# Patient Record
(demographics unavailable — no encounter records)

---

## 2024-10-08 NOTE — HISTORY OF PRESENT ILLNESS
[FreeTextEntry1] : 58 yr old here for follow up for vaginal irritation, states she had sex thursday and saturday and has noticed burning, itching and an odor. Has a hx of HSV, states this doesnt feel like one of her outbreaks. Endorses chronic vaginal dryness, has a hx of BV in the past. Denies any urinary sx. vaginal bleeding

## 2024-10-08 NOTE — PLAN
[FreeTextEntry1] : affirm done GC/CT declining STI bloods flagyl sent (pt prefers oral) discussed vaginal atrophy, declining vaginal estrogen reviewed to avoid douching, reviewed cindy Sanchez MD

## 2024-10-08 NOTE — PHYSICAL EXAM
[Labia Majora] : normal [Labia Minora] : normal [Atrophy] : atrophy [Normal] : normal [Uterine Adnexae] : normal [FreeTextEntry4] : scant discharge, +fishy odor

## 2024-11-20 NOTE — HISTORY OF PRESENT ILLNESS
[FreeTextEntry1] : cough [de-identified] : uri symps 3-4 days cough, congestion, sore throat, productive cough--not improving no temps--rapid covid neg has an outbreak of genital herpes

## 2024-12-23 NOTE — ADDENDUM
[FreeTextEntry1] : This note was written by Lj Plasencia on 12/23/2024 acting as scribe for Dr. Erasto Sethi M.D.  I, Erasto Sethi MD, have read and attest that all the information, medical decision making and discharge instructions within are true and accurate.

## 2024-12-23 NOTE — ADDENDUM
SAFETY PLAN    A suicide Safety Plan is a document that supports someone when they are having thoughts of suicide. Warning Signs that indicate a suicidal crisis may be developing: What (situations, thoughts, feelings, body sensations, behaviors, etc.) do you experience that lets you know you are beginning to think about suicide? 1. Not taking medications  2. restlessness  3. Mood swings, mad irritated    Internal Coping Strategies:  What things can I do (relaxation techniques, hobbies, physical activities, etc.) to take my mind off my problems without contacting another person? 1. walking  2. Call family  3. read    People and social settings that provide distraction: Who can I call or where can I go to distract me? 1. Name: Oz Sonotek  Phone: unknown  2. Name: Chrissy Santana  Phone:   3. Place:  none          4. Place: [employer] whom I can ask for help: Who can I call when I need help - for example, friends, family, clergy, someone else? 1. Name: Shellia Plate               Phone: unknown at this time  2. Name: Caron  Phone: unknown  3. Name: Unknown  Phone: unknown    Professionals or [employer] agencies I can contact during a crisis: Who can I call for help - for example, my doctor, my psychiatrist, my psychologist, a mental health provider, a suicide hotline? 1. Clinician Name: Kerri Theodore  Phone:       Clinician Pager or Emergency Contact #:[phone]    2. Clinician Name: none  Phone: none      Clinician Pager or Emergency Contact #: none    3. Suicide Prevention Lifeline: 4-410-734-TALK (4297)    4.  [address] [place] [address] Emergency Services -  for example, [address] [facility] suicide hotline, [facility] Hotline: 4-781-125-863.736.5705      Emergency Services Address: [address] [facility]      Emergency Services Phone: [phone] [FreeTextEntry1] : This note was written by Lj Plasencia on 12/23/2024 acting as scribe for Dr. Erasto Sethi M.D.  I, Erasto Sethi MD, have read and attest that all the information, medical decision making and discharge instructions within are true and accurate. Making the environment safe: How can I make my environment (house/apartment/living space) safer? For example, can I remove guns, medications, and other items? 1. Removing items that cause harm  2.  Have someone else assist with administer medication

## 2024-12-23 NOTE — DISCUSSION/SUMMARY
[de-identified] :  Discussed all options. All options discussed including rest, medicine, home exercise, acupuncture, Chiropractic care, Physical Therapy, Pain management, and last resort surgery. All questions were answered, all alternatives discussed, and the patient is in complete agreement with the treatment plan which the patient contributed to and discussed with me through the shared decision-making process. Follow-up appointment as instructed. Any issues and the patient will call or come in sooner.

## 2024-12-23 NOTE — HISTORY OF PRESENT ILLNESS
[de-identified] : 59 year old female presents for initial evaluation of   No fever, chills, sweats, nausea/vomiting. No bowel or bladder dysfunction, no recent weight loss or gain. No night pain. This history is in addition to the intake form that I personally reviewed.

## 2024-12-23 NOTE — HISTORY OF PRESENT ILLNESS
initiation of breastfeeding/breast milk feeding [de-identified] : 59 year old female presents for initial evaluation of   No fever, chills, sweats, nausea/vomiting. No bowel or bladder dysfunction, no recent weight loss or gain. No night pain. This history is in addition to the intake form that I personally reviewed.

## 2024-12-23 NOTE — PHYSICAL EXAM
[Normal] : Gait: normal [Estrada's Sign] : negative Estrada's sign [Pronator Drift] : negative pronator drift [SLR] : negative straight leg raise [de-identified] : 5 out of 5 motor strength, sensation is intact and symmetrical full range of motion flexion extension and rotation, no palpatory tenderness full range of motion of hips knees shoulders and elbows (all four extremities), no atrophy, negative straight leg raise, no pathological reflexes, no swelling, normal ambulation, no apparent distress skin is intact, no palpable lymph nodes, no upper or lower extremity instability, alert and oriented x3 and normal mood. Normal finger-to nose test.  No upper motor neuron findings.

## 2024-12-23 NOTE — DISCUSSION/SUMMARY
[de-identified] :  Discussed all options. All options discussed including rest, medicine, home exercise, acupuncture, Chiropractic care, Physical Therapy, Pain management, and last resort surgery. All questions were answered, all alternatives discussed, and the patient is in complete agreement with the treatment plan which the patient contributed to and discussed with me through the shared decision-making process. Follow-up appointment as instructed. Any issues and the patient will call or come in sooner.

## 2024-12-24 NOTE — ASSESSMENT
[FreeTextEntry1] : 1.  History of colonic polyp, family history of colonic polyps (father); moderately redundant colon, hemorrhoids at last colonoscopy October 2019--rule out colorectal neoplasm. 2.  Occasional heartburn without alarm symptoms; last EGD October 2019 was normal. 3.  Overweight. 4.  History of attention deficit disorder.  Plan: 1.  Medical records reviewed. 2.  Agree with need for periodic colonoscopy--Procedure, rationale, anesthesia plan, and MiraLAX prep instructions were again reviewed and brochure given. 3.  No plans for repeat EGD at this time.

## 2024-12-24 NOTE — CONSULT LETTER
[Dear  ___] : Dear  [unfilled], [Please see my note below.] : Please see my note below. [Consult Closing:] : Thank you very much for allowing me to participate in the care of this patient.  If you have any questions, please do not hesitate to contact me. [Sincerely,] : Sincerely, [FreeTextEntry3] : Austin Mckeon M.D.

## 2024-12-24 NOTE — PHYSICAL EXAM
[Alert] : alert [Normal Voice/Communication] : normal voice/communication [No Acute Distress] : no acute distress [Well Developed] : well developed [Well Nourished] : well nourished [None] : no edema [Bowel Sounds] : normal bowel sounds [Abdomen Tenderness] : non-tender [No Masses] : no abdominal mass palpated [Abdomen Soft] : soft [] : no hepatosplenomegaly [No Hernia] : no hernia [Normal Sphincter Tone] : normal sphincter tone [No External Hemorrhoid] : no external hemorrhoids [Inguinal Lymph Nodes Enlarged Bilaterally] : no inguinal lymphadenopathy [Normal Color / Pigmentation] : normal skin color and pigmentation [Normal] : oriented to person, place, and time [Occult Blood] : negative occult blood [de-identified] : mildly overweight

## 2024-12-24 NOTE — HISTORY OF PRESENT ILLNESS
[FreeTextEntry1] : Magali presents for consideration of repeat colonoscopy.  A rectosigmoid polyp had reportedly been removed in 2009.  Last colonoscopy October 2019 revealed moderately redundant colon and hemorrhoids. Other than occasional heartburn for which she takes no medicine, Magali denies GI symptoms at this time.  Her father had colonic polyps, as well.

## 2025-01-14 NOTE — PLAN
[FreeTextEntry1] : 58 yo pt presents for annual exam.  Health Care Management: - Discussed probiotics - Discussed vaginal moisturizers and vaginal estrogen - Pap today - STD bloods today - Mammo UTD, pt to follow up with breast surgeon - Rx pelvic sono today for f/u fibroids - Schedule for colonoscopy next month (fam hx of polyps)  RTO in one year for annual.

## 2025-01-14 NOTE — END OF VISIT
[FreeTextEntry3] :  I, Rogelio Prabhakar, acted as a scribe on behalf of Dr. Teresita De Luna M.D. on 01/08/2025.      All medical entries made by the scribe were at my, Dr. Teresita De Luna M.D., direction and personally dictated by me on 01/08/2025. I have reviewed the chart and agree that the record accurately reflects my personal performance of the history, physical exam, assessment and plan. I have also personally directed, reviewed, and agreed with the chart.
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
## 2025-01-14 NOTE — REVIEW OF SYSTEMS
[Negative] : Heme/Lymph [Back Pain] : back pain [FreeTextEntry9] : see hpi [de-identified] : concussion

## 2025-01-14 NOTE — REVIEW OF SYSTEMS
[Negative] : Heme/Lymph [Back Pain] : back pain [FreeTextEntry9] : see hpi [de-identified] : concussion

## 2025-01-14 NOTE — PLAN
[FreeTextEntry1] : 60 yo pt presents for annual exam.  Health Care Management: - Discussed probiotics - Discussed vaginal moisturizers and vaginal estrogen - Pap today - STD bloods today - Mammo UTD, pt to follow up with breast surgeon - Rx pelvic sono today for f/u fibroids - Schedule for colonoscopy next month (fam hx of polyps)  RTO in one year for annual.
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
## 2025-01-23 NOTE — PHYSICAL EXAM
[Normal] : Gait: normal [Estrada's Sign] : negative Estrada's sign [Pronator Drift] : negative pronator drift [SLR] : negative straight leg raise [de-identified] : 5 out of 5 motor strength, sensation is intact and symmetrical full range of motion flexion extension and rotation, no palpatory tenderness full range of motion of hips knees shoulders and elbows (all four extremities), no atrophy, negative straight leg raise, no pathological reflexes, no swelling, normal ambulation, no apparent distress skin is intact, no palpable lymph nodes, no upper or lower extremity instability, alert and oriented x3 and normal mood. Normal finger-to nose test.  No upper motor neuron findings. [de-identified] : I reviewed, interpreted and clinically correlated the following outside imaging studies, MR SPINE LUMBAR  - ORDERED BY: MARYA WATSON   PROCEDURE DATE:  12/12/2024    INTERPRETATION:  LUMBAR SPINE MRI  CLINICAL INFORMATION: Pain radiating to the right leg. No prior surgery.  TECHNIQUE: Multiplanar, multisequence MRI was obtained of the lumbar spine.  COMPARISON:  Dated the/8/2022. No prior MRI available..  FINDINGS:  OSSEOUS: Borderline mild levoscoliosis grade 1 left lateral listhesis at L4-L5. Cantilever alignment/grade 1 retrolisthesis at L5-S1. No acute fracture or aggressive osseous neoplasm. Native spinal canal measures 1.2 cm in AP dimension at the level of L5.  LEVEL BY LEVEL ANALYSIS:  T12-L1: No significant abnormality.  L1-L2: No significant abnormality.  L2-L3: No significant abnormality.  L3-L4: Annular disc osteophyte complex and moderate left worse than right facet arthrosis contribute to mild to moderate spinal canal stenosis with right slightly worse than left partial effacement of the lateral recesses and mild bilateral neural canal stenosis.  L4-L5: Left posterior paracentral predominant disc osteophyte complex versus recurrent protrusion, mild to moderate facet arthrosis, and left than right ligamentum flavum thickening/buckling contribute to moderately severe spinal canal stenosis with left worse than right lateral recess stenosis and likely impingement of descending left-sided L5 nerve root. Moderate left worse than right neural canal stenosis.  L5-S1: Small right posterior paracentral disc protrusion contributes to mild spinal canal stenosis with partial effacement the right lateral recess and abutment/mild posterior displacement of the descending ipsilateral S1 nerve root, but without complete effacement of normal surrounding CSF signal. Mild to moderate right worse than left neural canal stenosis.  GENERAL: Conus medullaris tip is anatomic in positioning. No intradural or extradural lesion.  IMPRESSION: 1.  At L4-L5 a left posterior paracentral predominant disc osteophyte complex versus protrusion contributes to moderately severe spinal canal stenosis and left worse than right lateral recess stenosis. Correlate for possible left-sided L5 radiculopathy. 2.  Otherwise mild to moderate multilevel lumbar spinal canal or neural canal stenosis, as detailed in the body section of this report. 3.  Small right posterior paracentral disc protrusion at L5-S1, another potential pain generator. 4.  Background congenital lumbar stenosis and borderline mild levoscoliosis.   ADDITIONAL CLINICAL INFORMATION: Other Condition see Clinical Info  --- End of Report ---    MAGNETIC RESONANCE IMAGING OF THE CERVICAL SPINE   6/21/24   (Stand Up MRI)      TECHNIQUE: Multiplanar, multisequential MRI was performed in the neutral sitting position.     HISTORY: The patient complains of neck pain radiating to bilateral upper extremities with numbness, weakness and headaches.     INTERPRETATION: Examination is compared to previous MRI study of the cervical spine dated 3/26/19.     Patient was not able to remain still and there is reduction of image clarity as a result of the patient motion limiting assessment.     At the C2/3 disc space level, disc bulge is noted deforming the thecal sac contributing to mild central spinal stenosis in conjunction with posterior ligamentous hypertrophy. There is no evidence of neural foraminal stenosis. Loss of disc signal is noted with preservation of disc space height.     At C3/4, disc herniation is noted deforming the thecal sac abutting the spinal cord contributing to moderate central spinal stenosis in conjunction with posterior ligamentous hypertrophy. There is no evidence of neural foraminal stenosis. Loss of disc signal is noted with preservation of disc space height.     At C4/5, disc herniation is noted with paracentral osseous hypertrophic changes deforming the thecal sac abutting the spinal cord contributing to moderate central spinal stenosis in conjunction with posterior ligamentous hypertrophy with mild right neural foraminal narrowing in conjunction with facet and uncinate hypertrophic changes. There is no evidence of left neural foraminal stenosis. Loss of disc signal is noted with preservation of disc space height with anterior hypertrophic changes and anterior disc extension.     At C5/6, disc herniation is noted with paracentral osseous hypertrophic changes deforming the thecal sac abutting the spinal cord. Neural foraminal stenosis is not appreciated. Loss of disc space height and signal is associated with disc degeneration, anterior hypertrophic changes and anterior disc extension.     At C6/7, disc herniation is noted with paracentral osseous hypertrophic changes deforming the thecal sac abutting the spinal cord contributing to moderate central spinal stenosis in conjunction with posterior ligamentous hypertrophy with mild bilateral neural foraminal narrowing in conjunction with facet and uncinate hypertrophic changes. Loss of disc space height and signal is associated with disc degeneration, anterior hypertrophic changes and anterior disc extension with Modic Type I endplate signal changes also suggested.     At C7/T1, right paracentral disc herniation is noted deforming the anterior margin of the thecal sac. There is no evidence of neural foraminal stenosis. Loss of disc signal is noted with preservation of disc space height.     There is only limited assessment provided of the T1/2-T4/5 disc space levels at the peripheral margin of the included field of view.     Cervical spine straightening is noted, nonspecific finding which meets the criteria for muscle spasm.     There is no evidence of cervical vertebral body compression fracture. There is no evidence of spondylolisthesis. There is no evidence of mass at the craniocervical junction. There is no evidence of atlantoaxial subluxation. There is no evidence of prevertebral soft tissue edema. There is no evidence of syringohydromyelia. There is no evidence of signal elevation within the cervical spinal cord suggested.     C1/2 ligamentous hypertrophy is noted deforming the thecal sac.     Examination is compared to previous MRI study dated 3/26/19. C2/3 disc bulge is currently identified. C2/3 disc herniation was previously identified. C3/4 disc herniation appears increased in size compared to prior exam with moderate central spinal stenosis now identified. C4/5 moderate central spinal stenosis and mild right neural foraminal narrowing is now identified. C7/T1 right paracentral disc herniation was not identified on prior exam.     IMPRESSION:     C3/4, C4/5, C5/6, C6/7 and C7/T1 disc herniations deforming the thecal sac, C3/4-C6/7 cord abutment and C7/T1 right paracentral orientation, C4/5-C6/7 paracentral osseous hypertrophic changes, C5/6 and C6/7 disc degeneration, C4/5 mild right neural foraminal narrowing with C6/7 mild bilateral neural foraminal narrowing in conjunction with facet and uncinate hypertrophic changes, C3/4, C4/5 and C6/7 moderate central spinal stenosis in conjunction with posterior ligamentous hypertrophy.    C2/3 disc bulge with mild central spinal stenosis in conjunction with posterior ligamentous hypertrophy.    Cervical spine straightening.    C1/2 ligamentous hypertrophy deforming the thecal sac.    Image clarity diminished in conjunction with patient motion.

## 2025-01-23 NOTE — DISCUSSION/SUMMARY
[Medication Risks Reviewed] : Medication risks reviewed [Surgical risks reviewed] : Surgical risks reviewed [de-identified] : L4-5 left posterior protrusion causing severe stenosis. C3-4, C4-5, C6-7 moderate stenosis. Discussed all options. She will receive lumbar surgery with Dr. Perea. Discussed C3-7 POST and L4-5 laminectomy. She should receive cervical surgery before lumbar surgery. Her main complaints are cervical radiculopathy and her worsening handwriting. Case discussed with Dr. Perea and I did mention he should evaluate her cervical spine prior to her lumbar surgery as she may need something done on her cervical spine as well. She was here for second opinion. Surgery will involve a posterior cervical decompression and fusion utilizing rods and screws used off label and explained to the patient, local autograft bone and spinal cord monitoring will be utilized. Martinez head negro. Risks of surgery include infection, dural tear, nerve root injury, spinal cord injury, adjacent level breakdown, future arm pain, future neck pain, difficulty swallowing, difficulty breathing, recurrent laryngeal nerve injury, esophageal in the injury, tracheal injury, hematoma, Martinez head negro risks. C5-6 nerve palsy, hardware failure, adjacent level surgery, anesthetic risk, positioning pain, blood transfusion risk, nonunion requiring additional surgery, deep vein thrombosis, pulmonary embolus, myocardial infarction and death. Somatosensory evoke potentials and motor evoked potentials and EMG monitoring will be used. Patient will need spinal orthosis pre and post operatively. All risks were explained not exclusive to the ones mentioned alternatives were discussed and all questions were answered the patient agrees and understands the above and is in complete agreement with the plan. Risks of surgery include infection, dural tear, nerve root injury, reherniation, future leg pain, future back pain, retained fragment, hematoma, urinary retention, worsening leg symptoms, foot drop, anesthetic risks, blood transfusion risks, positioning pain, visceral vascular injury, deep vein thrombosis, pulmonary embolus, stroke, unplanned return to OR, and death. All risks were explained not exclusive to the ones mentioned alternatives were discussed and all questions were answered the patient agrees and understands the above and is in complete agreement with the plan. All options discussed including rest, medicine, home exercise, acupuncture, Chiropractic care, Physical Therapy, Pain management, and last resort surgery. All questions were answered, all alternatives discussed, and the patient is in complete agreement with the treatment plan which the patient contributed to and discussed with me through the shared decision-making process. Follow-up appointment as instructed. Any issues and the patient will call or come in sooner.

## 2025-01-23 NOTE — ADDENDUM
[FreeTextEntry1] : This note was written by Lj Plasencia on 01/23/2025 acting as scribe for Dr. Erasto Sethi M.D.  I, Erasto Sethi MD, have read and attest that all the information, medical decision making and discharge instructions within are true and accurate.

## 2025-02-10 NOTE — PHYSICAL EXAM
[No Acute Distress] : no acute distress [Well Nourished] : well nourished [Well Developed] : well developed [Well-Appearing] : well-appearing [No Respiratory Distress] : no respiratory distress  [No Accessory Muscle Use] : no accessory muscle use [Clear to Auscultation] : lungs were clear to auscultation bilaterally [Normal Rate] : normal rate  [Regular Rhythm] : with a regular rhythm [Normal S1, S2] : normal S1 and S2 [No Joint Swelling] : no joint swelling [Coordination Grossly Intact] : coordination grossly intact [No Focal Deficits] : no focal deficits [Normal Gait] : normal gait [Speech Grossly Normal] : speech grossly normal [Normal Affect] : the affect was normal [Alert and Oriented x3] : oriented to person, place, and time [Normal Mood] : the mood was normal [Normal Insight/Judgement] : insight and judgment were intact [Normal] : affect was normal and insight and judgment were intact

## 2025-02-10 NOTE — REVIEW OF SYSTEMS
[Back Pain] : back pain [Negative] : Psychiatric [Fever] : no fever [Chills] : no chills [Fatigue] : no fatigue [Chest Pain] : no chest pain [Palpitations] : no palpitations [Lower Ext Edema] : no lower extremity edema [Shortness Of Breath] : no shortness of breath [Wheezing] : no wheezing [Cough] : no cough [Dyspnea on Exertion] : no dyspnea on exertion [Headache] : no headache [Dizziness] : no dizziness [Fainting] : no fainting [Confusion] : no confusion [Unsteady Walking] : no ataxia [Suicidal] : not suicidal [Insomnia] : no insomnia [Anxiety] : no anxiety [Depression] : no depression

## 2025-02-10 NOTE — ASSESSMENT
[FreeTextEntry1] : Labs are wnl's and physical exam is benign Pt. is medically cleared for pending laminectomy on 2/18/25

## 2025-02-10 NOTE — HISTORY OF PRESENT ILLNESS
[FreeTextEntry1] : I am here for medical clearance [de-identified] : This 60 yo F presents in NAD with labs already drawn - for medical clearance. Pt. is scheduled for L5 S1 laminectomy on 2/18/25. she denies acute pain at present, stating it is just chronic, always there, mostly unrelieved by medication.  She states she is feeling otherwise well.

## 2025-06-23 NOTE — DISCUSSION/SUMMARY
[FreeTextEntry1] : 59-year-old woman right-handed with a history of injury back in early 2024 status post lumbar spine laminectomy February of this year.  Was diagnosed and evaluated by another neurologist for concussive symptoms.  Extensive testing showed no significant abnormalities.  Today's mental status examination within normal limits. Splane to the patient postconcussive symptoms are variable duration depending on the severity of the injury.  Treatment is supportive, cognitive engagement, activities such as games, cognitive engagement friends and family, physical activity. Attention to make sure she has proper length of sleep, quality of sleep.  Addressing mood disorders in a timely fashion. Patient will follow-up with her neurologist.  Will return if needed.
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
## 2025-06-23 NOTE — PHYSICAL EXAM
[General Appearance - Alert] : alert [General Appearance - In No Acute Distress] : in no acute distress [General Appearance - Well Nourished] : well nourished [General Appearance - Well Developed] : well developed [General Appearance - Well-Appearing] : healthy appearing [Oriented To Time, Place, And Person] : oriented to person, place, and time [Impaired Insight] : insight and judgment were intact [Affect] : the affect was normal [Mood] : the mood was normal [Memory Recent] : recent memory was not impaired [Memory Remote] : remote memory was not impaired [___ / 5] : Visuospatial / Executive: [unfilled] / 5 [0 / 0] : Memory: 0 / 0 [___ / 3] : Attention (Serial 7 subtraction): [unfilled] / 3 [___ / 1] : Fluency: [unfilled] / 1 [___ / 2] : Abstraction: [unfilled] / 2 [___ / 5] : Delayed Recall: [unfilled] / 5 [___ / 6] : Orientation: [unfilled] / 6 [Cranial Nerves Optic (II)] : visual acuity intact bilaterally,  visual fields full to confrontation, pupils equal round and reactive to light [Cranial Nerves Oculomotor (III)] : extraocular motion intact [Cranial Nerves Trigeminal (V)] : facial sensation intact symmetrically [Cranial Nerves Facial (VII)] : face symmetrical [Cranial Nerves Vestibulocochlear (VIII)] : hearing was intact bilaterally [Cranial Nerves Glossopharyngeal (IX)] : tongue and palate midline [Cranial Nerves Hypoglossal (XII)] : there was no tongue deviation with protrusion [Motor Tone] : muscle tone was normal in all four extremities [Motor Strength] : muscle strength was normal in all four extremities [No Muscle Atrophy] : normal bulk in all four extremities [Motor Handedness Right-Handed] : the patient is right hand dominant [Paresis Pronator Drift Right-Sided] : no pronator drift on the right [Paresis Pronator Drift Left-Sided] : no pronator drift on the left [Sensation Tactile Decrease] : light touch was intact [Abnormal Walk] : normal gait [Limited Balance] : balance was intact [Past-pointing] : there was no past-pointing [Tremor] : no tremor present [Dysdiadochokinesia Bilaterally] : not present [Coordination - Dysmetria Impaired Finger-to-Nose Bilateral] : not present [Coordination - Dysmetria Impaired Heel-to-Shin Bilateral] : not present [2+] : Ankle jerk left 2+ [MocaTotal] : 27 [Arterial Pulses Carotid] : carotid pulses were normal with no bruits [Full Pulse] : the pedal pulses are present [Edema] : there was no peripheral edema

## 2025-06-23 NOTE — REVIEW OF SYSTEMS
[As Noted in HPI] : as noted in HPI [Memory Lapses or Loss] : memory loss [Decr. Concentrating Ability] : decreased concentrating ability [Negative] : Heme/Lymph [FreeTextEntry9] : Back pain, neck pain

## 2025-06-23 NOTE — DATA REVIEWED
[de-identified] :  CT Head No Cont             Final  No Documents Attached    	 EXAM: [id number] - CT BRAIN  - ORDERED BY: FAISAL MENSAH   PROCEDURE DATE:  05/08/2024    INTERPRETATION:  CLINICAL INDICATION: Head trauma  TECHNIQUE: Axial CT scanning of the brain was obtained from the skull base to the vertex without the administration of intravenous contrast. Reformatted coronal and sagittal images were subsequently obtained and reviewed.  COMPARISON: None  FINDINGS: There is no CT evidence of acute transcortical infarct.  There is no hydrocephalus, mass effect, or acute intracranial hemorrhage. No extra-axial collection. Basal cisterns are patent.  The visualized paranasal sinuses and mastoid air cells are clear.  The calvarium is intact.  IMPRESSION: No acute intracranial hemorrhage or mass effect.  --- End of Report ---